# Patient Record
(demographics unavailable — no encounter records)

---

## 2025-01-14 NOTE — ASSESSMENT
[FreeTextEntry1] : Assessment: The patient presents with classic symptoms of obstructive sleep apnea (KYLEE), including loud snoring, witnessed apneas, and excessive daytime sleepiness. His sleep pattern indicates fragmented sleep, likely due to frequent awakenings. His reported tiredness upon waking and sleepiness while driving further support the possibility of KYLEE. Additionally, his neck size and BMI (obesity) are risk factors that increase the likelihood of KYLEE. His STOPBANG score, though not directly provided, is likely elevated based on his clinical presentation, which further supports this possibility.  Given his symptoms, a sleep study is warranted to evaluate the severity of his sleep apnea and guide treatment.  Plan:  Sleep Study: watch pat study to assess for the presence and severity of obstructive sleep apnea (KYLEE). Consider CPAP Therapy: If KYLEE is diagnosed, initiate treatment with CPAP therapy, especially if the sleep study reveals moderate to severe apnea. Weight Management: Encourage weight loss, as reducing weight may improve symptoms of KYLEE. This could be accomplished through diet and exercise.  Safety Concerns: Discuss the importance of addressing excessive daytime sleepiness, particularly regarding driving. Recommend that the patient avoid driving if they feel excessively sleepy, as this poses a significant safety risk. Patient Education:  Educated the patient on the risks associated with untreated sleep apnea, including cardiovascular and metabolic complications, and the importance of treatment.  Follow up after the sleep study to review results and initiate appropriate treatment.

## 2025-01-14 NOTE — PHYSICAL EXAM
[General Appearance - Well Developed] : well developed [General Appearance - Well Nourished] : well nourished [Enlarged Base of the Tongue] : enlargement of the base of the tongue [III] : III [Heart Sounds] : normal S1 and S2 [Murmurs] : no murmurs [] : no respiratory distress [Auscultation Breath Sounds / Voice Sounds] : lungs were clear to auscultation bilaterally [No Focal Deficits] : no focal deficits [Oriented To Time, Place, And Person] : oriented to person, place, and time [Memory Recent] : recent memory was not impaired [FreeTextEntry1] : narrow side to side as well

## 2025-01-14 NOTE — HISTORY OF PRESENT ILLNESS
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  Patient is sleepy with [place] sleepiness score of 12.  Patient has very loud snoring which disturbs his wife, also has witnessed apneas.  Patient's bedtime is around 9 PM wakes up in the morning around 3.30 AM. His sleep is disturbed as he wakes up frequently for his kid who is 8 months old.  He feels tired when he wakes up.  Patient does not drink coffee during the daytime. Patient does not have any headaches, has nocturia wakes up twice. He is sleepy while driving. STOPBANG score - 6 neck size - 17 and half inches

## 2025-02-26 NOTE — HISTORY OF PRESENT ILLNESS
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  Patient is sleepy with [place] sleepiness score of 12.  Patient has very loud snoring which disturbs his wife, also has witnessed apneas.  Patient's bedtime is around 9 PM wakes up in the morning around 3.30 AM. His sleep is disturbed as he wakes up frequently for his kid who is 8 months old.  He feels tired when he wakes up.  Patient does not drink coffee during the daytime. Patient does not have any headaches, has nocturia wakes up twice. He is sleepy while driving. STOPBANG score - 6 neck size - 17 and half inches  2/26/25: Discussed with pt results of HST completed on 1/31/25 which revealed severe sleep apnea with an AHI of 45.5 events/hr, tamera 80%.  Discussed potential health consequences of untreated sleep apnea including but not limited to HTN, weight gain, fatigue, increased risk of MI, CVA, arrythmias, heart failure and CAD. Pt agreeable to cpap therapy.  DME: [facility] Full face mask (believes he is a mouth breather)

## 2025-02-26 NOTE — REASON FOR VISIT
[Home] : at home, [unfilled] , at the time of the visit. [Medical Office: ([facility])___] : at the medical office located in  [Telehealth (audio & video)] : This visit was provided via telehealth using real-time 2-way audio visual technology. [Verbal consent obtained from patient] : the patient, [unfilled] [Follow-Up] : a follow-up visit [Sleep Apnea] : sleep apnea